# Patient Record
Sex: FEMALE | Race: WHITE | NOT HISPANIC OR LATINO | Employment: FULL TIME | URBAN - METROPOLITAN AREA
[De-identification: names, ages, dates, MRNs, and addresses within clinical notes are randomized per-mention and may not be internally consistent; named-entity substitution may affect disease eponyms.]

---

## 2022-09-14 ENCOUNTER — PATIENT OUTREACH (OUTPATIENT)
Dept: ONCOLOGY | Facility: CLINIC | Age: 37
End: 2022-09-14

## 2022-09-14 ENCOUNTER — TRANSCRIBE ORDERS (OUTPATIENT)
Dept: OTHER | Age: 37
End: 2022-09-14

## 2022-09-14 DIAGNOSIS — Z80.3 FAMILY HISTORY OF MALIGNANT NEOPLASM OF BREAST: Primary | ICD-10-CM

## 2022-09-15 ENCOUNTER — PRE VISIT (OUTPATIENT)
Dept: ONCOLOGY | Facility: CLINIC | Age: 37
End: 2022-09-15

## 2022-09-15 NOTE — TELEPHONE ENCOUNTER
RECORDS STATUS - BREAST    RECORDS REQUESTED FROM: [facility]   DATE REQUESTED: 09/16/22   NOTES DETAILS STATUS   OFFICE NOTE from referring provider External: [facility] 09/06/22: Sandra Wiley   MEDICATION LIST [facility]    IMAGING (NEED IMAGES & REPORT)     MAMMO PACS 08/04/22, 09/25/12

## 2022-09-22 ENCOUNTER — VIRTUAL VISIT (OUTPATIENT)
Dept: ONCOLOGY | Facility: CLINIC | Age: 37
End: 2022-09-22
Attending: GENETIC COUNSELOR, MS
Payer: COMMERCIAL

## 2022-09-22 DIAGNOSIS — Z80.3 FAMILY HISTORY OF MALIGNANT NEOPLASM OF BREAST: Primary | ICD-10-CM

## 2022-09-22 PROCEDURE — 96040 HC GENETIC COUNSELING, EACH 30 MINUTES: CPT | Mod: 95 | Performed by: GENETIC COUNSELOR, MS

## 2022-09-22 NOTE — PATIENT INSTRUCTIONS
Assessing Cancer Risk  Only about 5-10% of cancers are thought to be due to an inherited cancer susceptibility gene.    These families often have:  Several people with the same or related types of cancer  Cancers diagnosed at a young age (before age 50)  Individuals with more than one primary cancer  Multiple generations of the family affected with cancer    Some people may be candidates for genetic testing of more than one gene.  For these families, genetic testing using a cancer panel may be offered.  These panels will test different genes known to increase the risk for breast, ovarian, uterine, and/or other cancers. All of the genes discussed below have published clinical management guidelines for individuals who are found to carry a mutation. The purpose of this handout is to serve as a brief summary of the genes analyzed by the panels used to inquire about hereditary breast and gynecologic cancer:  DYLAN, BRCA1, BRCA2, BRIP1, CDH1, CHEK2, MLH1, MSH2, MSH6, PMS2, EPCAM, PTEN, PALB2, RAD51C, RAD51D, and TP53.  ______________________________________________________________________________  Hereditary Breast and Ovarian Cancer Syndrome   (BRCA1 and BRCA2)  A single mutation in one of the copies of BRCA1 or BRCA2 increases the risk for breast and ovarian cancer, among others.  The risk for pancreatic cancer and melanoma may also be slightly increased in some families.  The chart below shows the chance that someone with a BRCA mutation would develop cancer in his or her lifetime1,2,3,4.        A person s ethnic background is also important to consider, as individuals of Ashkenazi Religion ancestry have a higher chance of having a BRCA gene mutation.  There are three BRCA mutations that occur more frequently in this population.    Gilbert Syndrome   (MLH1, MSH2, MSH6, PMS2, and EPCAM)  Currently five genes are known to cause Gilbert Syndrome: MLH1, MSH2, MSH6, PMS2, and EPCAM.  A single mutation in one of the Gilbert  Syndrome genes increases the risk for colon, endometrial, ovarian, and stomach cancers.  Other cancers that occur less commonly in Gilbert Syndrome include urinary tract, skin, and brain cancers.  The chart below shows the chance that a person with Gilbert syndrome would develop cancer in his or her lifetime5.      *Cancer risk varies depending on Gilbert syndrome gene found    Cowden Syndrome   (PTEN)  Cowden syndrome is a hereditary condition that increases the risk for breast, thyroid, endometrial, colon, and kidney cancer.  Cowden syndrome is caused by a mutation in the PTEN gene.  A single mutation in one of the copies of PTEN causes Cowden syndrome and increases cancer risk.  The chart below shows the chance that someone with a PTEN mutation would develop cancer in their lifetime6,7.  Other benign features seen in some individuals with Cowden syndrome include benign skin lesions (facial papules, keratoses, lipomas), learning disability, autism, thyroid nodules, colon polyps, and larger head size.      *One recent study found breast cancer risk to be increased to 85%    Li-Fraumeni Syndrome   (TP53)  Li-Fraumeni Syndrome (LFS) is a cancer predisposition syndrome caused by a mutation in the TP53 gene. A single mutation in one of the copies of TP53 increases the risk for multiple cancers. Individuals with LFS are at an increased risk for developing cancer at a young age. The lifetime risk for development of a LFS-associated cancer is 50% by age 30 and 90% by age 60.   Core Cancers: Sarcomas, Breast, Brain, Lung, Leukemias/Lymphomas, Adrenocortical carcinomas  Other Cancers: Gastrointestinal, Thyroid, Skin, Genitourinary    Hereditary Diffuse Gastric Cancer   (CDH1)  Currently, one gene is known to cause hereditary diffuse gastric cancer (HDGC): CDH1.  Individuals with HDGC are at increased risk for diffuse gastric cancer and lobular breast cancer. Of people diagnosed with HDGC, 30-50% have a mutation in the CDH1 gene.   This suggests there are likely other genes that may cause HDGC that have not been identified yet.      Lifetime Cancer Risks    General Population HDGC    Diffuse Gastric  <1% ~80%   Breast 12% 39-52%         Additional Genes  DYLAN  DYLAN is a moderate-risk breast cancer gene. Women who have a mutation in DYLAN can have between a 2-4 fold increased risk for breast cancer compared to the general population8. DYLAN mutations have also been associated with increased risk for pancreatic cancer, however an estimate of this cancer risk is not well understood9. Individuals who inherit two DYLAN mutations have a condition called ataxia-telangiectasia (AT).  This rare autosomal recessive condition affects the nervous system and immune system, and is associated with progressive cerebellar ataxia beginning in childhood.  Individuals with ataxia-telangiectasia often have a weakened immune system and have an increased risk for childhood cancers.    PALB2  Mutations in PALB2 have been shown to increase the risk of breast cancer up to 33-58% in some families; where individuals fall within this risk range is dependent upon family [id number]. PALB2 mutations have also been associated with increased risk for pancreatic cancer, although this risk has not been quantified yet.  Individuals who inherit two PALB2 mutations--one from their mother and one from their father--have a condition called Fanconi Anemia.  This rare autosomal recessive condition is associated with short stature, developmental delay, bone marrow failure, and increased risk for childhood cancers.    CHEK2   CHEK2 is a moderate-risk breast cancer gene.  Women who have a mutation in CHEK2 have around a 2-fold increased risk for breast cancer compared to the general population, and this risk may be higher depending upon family history.11,12,13 Mutations in CHEK2 have also been shown to increase the risk of a number of other cancers, including colon and prostate, however these  cancer risks are currently not well understood.    BRIP1, RAD51C and RAD51D  Mutations in BRIP1, RAD51C, and RAD51D have been shown to increase the risk of ovarian cancer and possibly female breast cancer as well14,15 .       Lifetime Cancer Risk    General Population BRIP1 RAD51C RAD51D   Ovarian 1-2% ~5-8% ~5-9% ~7-15%           Inheritance  All of the cancer syndromes reviewed above are inherited in an autosomal dominant pattern.  This means that if a parent has a mutation, each of his or her children will have a 50% chance of inheriting that same mutation.  Therefore, each child--male or female--would have a 50% chance of being at increased risk for developing cancer.      Image obtained from Genetics Home Reference, 2013     Mutations in some genes can occur de jose cruz, which means that a person s mutation occurred for the first time in them and was not inherited from a parent.  Now that they have the mutation, however, it can be passed on to future generations.    Genetic Testing  Genetic testing involves a blood test and will look at the genetic information in the DYLAN, BRCA1, BRCA2, BRIP1, CDH1, CHEK2, MLH1, MSH2, MSH6, PMS2, EPCAM, PTEN, PALB2, RAD51C, RAD51D, and TP53 genes for any harmful mutations that are associated with increased cancer risk.  If possible, it is recommended that the person(s) who has had cancer be tested before other family members.  That person will give us the most useful information about whether or not a specific gene is associated with the cancer in the family.    Results  There are three possible results of genetic testing:  Positive--a harmful mutation was identified in one or more of the genes  Negative--no mutation was identified in any of the genes on this panel  Variant of unknown significance--a variation in one of the genes was identified, but it is unclear how this impacts cancer risk in the family    Advantages and Disadvantages   There are advantages and disadvantages to  genetic testing.    Advantages  May clarify your cancer risk  Can help you make medical decisions  May explain the cancers in your family  May give useful information to your family members (if you share your results)    Disadvantages  Possible negative emotional impact of learning about inherited cancer risk  Uncertainty in interpreting a negative test result in some situations  Possible genetic discrimination concerns (see below)    Genetic Information Nondiscrimination Act (ELEANOR)  ELEANOR is a federal law that protects individuals from health insurance or employment discrimination based on a genetic test result alone.  Although rare, there are currently no legal discrimination protections in terms of life insurance, long term care, or disability insurances.  Visit the [employer] [place] website to learn more.    Reducing Cancer Risk  All of the genes described above have nationally recognized cancer screening guidelines that would be recommended for individuals who test positive.  In addition to increased cancer screening, surgeries may be offered or recommended to reduce cancer risk.  Recommendations are based upon an individual s genetic test result as well as their personal and family history of cancer.    Questions to Think About Regarding Genetic Testing:  What effect will the test result have on me and my relationship with my family members if I have an inherited gene mutation?  If I don t have a gene mutation?  Should I share my test results, and how will my family react to this news, which may also affect them?  Are my children ready to learn new information that may one day affect their own health?    Hereditary Cancer Resources    FORCE: Facing Our Risk of Cancer Empowered facingourrisk.org   Bright Pink bebrightpink.org   Li-Fraumeni Syndrome Association lfsassociation.org   PTEN World PTENworld.Built In   No stomach for cancer, Inc. nostomachforcancer.org   Stomach cancer relief network  Scrnet.org   Collaborative Group of the Americas on Inherited Colorectal Cancer (CGA) cgaicc.com    Cancer Care cancercare.org   American Cancer Society (ACS) cancer.org   National Cancer Downey (NCI) cancer.gov     Please call us if you have any questions or concerns.   Cancer Risk Management Program [phone] ([phone])  Eddie Ward, MS [facility]  [phone]  Salome Alexander, MS, [facility] [phone]  Yoselyn Grimm, MS, [facility]  [phone]  Emily Hudson, MS, [facility]  [phone]  Yari Torin, MS, [facility]  [phone]  Nancy Zacarias, MS, [facility] [phone]  Reina Rogers, MS, [facility] [phone]    References  Rachael Germain PDP, Rylan S, Samaria SMITH, Hari JE, Lakeshia JL, Jaz N, Michael H, Horacio O, Arin A, Zaid B, Yessy P, Manleslie S, Sabine DM, Dom N, Helga E, Erick H, Omer E, Lubinski J, Gronwald J, Rickey B, Marline H, Thorlacius S, Eerola H, Jose Angel H, Marlen K, Monica OP. Average risks of breast and ovarian cancer associated with BRCA1 or BRCA2 mutations detected in case series unselected for family history: a combined analysis of 222 studies. Am J Hum Lisa. 2003;72:1117-30.  Alice N, Elba M, Garcia G.  BRCA1 and BRCA2 Hereditary Breast and Ovarian Cancer. Gene Reviews online. 2013.  Jayce YC, Drew S, Brittany G, Foss S. Breast cancer risk among male BRCA1 and BRCA2 mutation carriers. J Natl Cancer Inst. 2007;99:1811-4.  Dax MOORE, Bob I, Micha J, Niru E, Angela ER, Mainor F. Risk of breast cancer in male BRCA2 carriers. J Med Lisa. 2010;47:710-1.  National Comprehensive Cancer Network. Clinical practice guidelines in oncology, colorectal cancer screening. Available online (registration required). 2015.  Henry SANZ, Kalyani J, Pao J, Batool LA, Tay MS, Eng C. Lifetime cancer risks in individuals with germline PTEN mutations. Clin Cancer Res. 2012;18:400-7.  Ivet CHAMORRO. Cowden Syndrome: A Critical Review of the Clinical Literature. J Lisa .  2009:18:13-27.  Jason A, Howard D, Vasu S, Rhonda P, Tyrell T, Ko M, Andrew B, Arik H, Giulia R, Jeannine K, Nona L, Dax DG, Sabine D, Sridhar DF, Fernanda MR, The Breast Cancer Susceptibility Collaboration () & Alem PRICE. DYLAN mutations that cause ataxia-telangiectasia are breast cancer susceptibility alleles. Nature Genetics. 2006;38:873-875  Barber N , Abilio Y, Myrtle J, Ilene L, Negra GM , Renee ML, Gallinger S, Romo AG, Syngal S, Francisco ML, Karley J , Nohemi R, Priya SZ, Deepak JR, Rama VE, Pierce M, Voinlton B, Zoie N, Don RH, Angy KW, and Rashaun AP. DYLAN mutations in patients with hereditary pancreatic cancer. Cancer Discover. 2012;2:41-46  Nadia HAND., et al. Breast-Cancer Risk in Families with Mutations in PALB2. NEJM. 2014; 371(6):497-506.  CHEK2 Breast Cancer Case-Control Consortium. CHEK2*1100delC and susceptibility to breast cancer: A collaborative analysis involving 10,860 breast cancer cases and 9,065 controls from 10 studies. Am J Hum Lisa, 74 (2004), pp. 2182-3718  Gaurang T, Wilmar S, Bouchra K, et al. Spectrum of Mutations in BRCA1, BRCA2, CHEK2, and TP53 in Families at High Risk of Breast Cancer. RAPHAEL. 2006;295(12):1121-7143.   Lenny C, Hema D, Sarath A, et al. Risk of breast cancer in women with a CHEK2 mutation with and without a family history of breast cancer. J Clin Oncol. 2011;29:4893-6254.  Kory H, Magalys E, Noe SJ, et al. Contribution of germline mutations in the RAD51B, RAD51C, and RAD51D genes to ovarian cancer in the population. J Clin Oncol. 2015;33(26):4260-7288. Doi:10.1200/JCO.2015.61.2408.  Ludwin Sandovalon DF, Caitlin P, et al. Mutations in BRIP1 confer high risk of ovarian cancer. Whitney Lisa. 2011;43(11):3491-7766. doi:10.1038/ng.955.

## 2022-09-22 NOTE — PROGRESS NOTES
Khushboo is a 36 year old who is being evaluated via a billable video visit.      Pt is in [place]    How would you like to obtain your AVS? MyChart  If the video visit is dropped, the invitation should be resent by: Send to e-mail at: [email or web address]  Will anyone else be joining your video visit? No    Video-Visit Details    Video Start Time: 11:00 am    Type of service:  Video Visit    Video End Time:11:42 am    Originating Location (pt. Location): Home    Distant Location (provider location):  [facility]     Platform used for Video Visit: Caterina GRANADOS    9/22/2022    Referring Provider: Sandra Gallegos MD     Presenting Information:   I spoke with Khushboo Colon over video today for genetic counseling to discuss her family history of cancer. With her permission, this appointment was conducted virtually due to COVID-19 precautions. We talked today to review this history, cancer screening recommendations, and available genetic testing options.    Personal History:  Khushboo is a 36 year old female. She does not have any personal history of cancer. She reports that she had genetic testing many years ago, but does not remember the results.     She had her first menstrual period at age 9, her first child at age 30, and is premenopausal. Khushboo has her ovaries, fallopian tubes and uterus in place. She had a tubal ligation in 2016. She reports that she has not used hormone replacement therapy. She has used oral contraceptives and used progesterone for both of her pregnancies. She has clinical breast exams and mammograms; her most recent mammogram on 8/4/22 was negative. She reports that she is planning to have a bilateral mastectomy due to her family history of breast cancer, regardless of her genetic testing results. Khushboo has not had a colonoscopy due to her age. Khushboo reported no history of tobacco use and rare alcohol use. She had exposures in [place] via burning  and other chemicals.    Family History: (Please see scanned pedigree for detailed family history information)  Siblings:    She has one full brother (age 35), a maternal half-brother (age 31) and maternal half-sister (age 33), and a paternal half-brother (age 19). None of her siblings have any history of cancer, although she reports that they do not go to doctors regularly.    Her half-sister has a son who has sleep apnea, a benign brain tumor (not operable), and sinus issues related to this.  Maternal:    Her mother was diagnosed with breast cancer at age 33 that was stage 4 at diagnosis. She passed away at age 35. She did not have any genetic testing.     Her grandmother was diagnosed with breast cancer in her early 50s and passed away at age 53. No genetic testing.   Paternal:    Her father is 60 years old with no known history of cancer.     Her aunt is 57 years old and has a history of cervical cancer in 2010, treated with hysterectomy.     Her maternal ethnicity is unknown. Her paternal ethnicity is Mongolian, English. There is no known Ashkenazi Shinto ancestry on either side of her family.     Discussion:    Khushboo's family history of cancer is suggestive of a hereditary cancer syndrome.    We reviewed the features of sporadic, familial, and hereditary cancers. In looking at Khushboo's family history, it is possible that a cancer susceptibility gene is present due to her maternal family history of breast cancer in her mother and grandmother.    Khushboo reports that she had genetic testing many years ago, but does not remember the results. We discussed that there are now a number of genes in which mutations are known to be associated with an increased risk for breast cancer, therefore updated genetic testing is indicated.    We discussed the natural history and genetics of hereditary cancer. Based on her family history, we discussed the BRCA1 and BRCA2 genes. Mutations in these genes cause a condition known as  Hereditary Breast and Ovarian Cancer syndrome (HBOC). Women with a mutation in either of these genes are at increased risk for breast and ovarian cancer. There is also an increased risk for a second primary breast cancer. Men with a mutation in either of these genes are at increased risk for breast and prostate cancer. Both women and men may also be at increased risk for pancreatic cancer and melanoma. A detailed handout regarding these genes and other genes in which mutations are associated with an increased risk for breast cancer will be provided to Khushboo via [employer] and can be found in the after visit summary. Topics included: inheritance pattern, cancer risks, cancer screening recommendations, and also risks, benefits and limitations of testing.      Based on her personal and family history, Khushboo meets current National Comprehensive Cancer Network (NCCN) criteria for genetic testing of high-penetrance breast cancer susceptibility genes, specifically, BRCA1, BRCA2, CDH1, PALB2, PTEN, and TP53.       We discussed that there are additional genes that could cause increased risk for breast and other cancers. As many of these genes present with overlapping features in a family and accurate cancer risk cannot always be established based upon the pedigree analysis alone, it would be reasonable for Khushboo to consider panel genetic testing to analyze multiple genes at once.    We reviewed genetic testing options for Khushboo based on her personal and family history: a panel of genes associated with an increased risk for certain cancers, or larger panel options to include genes associated with increased risk for multiple different cancer types. She expressed an interest in more broad testing and opted for the CancerNext Panel.  Genetic testing is available for 36 genes associated with hereditary cancer: CancerNext (APC, DLYAN, AXIN2, BARD1, BMPR1A, BRCA1, BRCA2, BRIP1, CDH1, CDK4, CDKN2A, CHEK2, DICER1, EPCAM, GREM1,  HOXB13, MLH1, MSH2, MSH3, MSH6, MUTYH, NBN, NF1, NTHL1, PALB2, PMS2, POLD1, POLE, PTEN, RAD51C, RAD51D, RECQL, SMAD4, SMARCA4, STK11, and TP53).  We discussed that many of the genes in the CancerNext panel are associated with specific hereditary cancer syndromes and published management guidelines: Hereditary Breast and Ovarian Cancer syndrome (BRCA1, BRCA2), Gilbert syndrome (MLH1, MSH2, MSH6, PMS2, EPCAM), Familial Adenomatous Polyposis (APC), Hereditary Diffuse Gastric Cancer (CDH1), Familial Atypical Multiple Mole Melanoma syndrome (CDK4, CDKN2A), Juvenile Polyposis syndrome (BMPR1A, SMAD4), Cowden syndrome (PTEN), Li Fraumeni syndrome (TP53), Peutz-Jeghers syndrome (STK11), MUTYH Associated Polyposis (MUTYH), and Neurofibromatosis type 1 (NF1).   The DYLAN, AXIN2, BRIP1, CHEK2, GREM1, MSH3, NBN, NTHL1, PALB2, POLD1, POLE, RAD51C, and RAD51D genes are associated with increased cancer risk and have published management guidelines for certain cancers.    The remaining genes (BARD1, DICER1, HOXB13, RECQL, and SMARCA4) are associated with increased cancer risk and may allow us to make medical recommendations when mutations are identified.      Due to COVID-19 precautions consent was obtained over the phone/video today. Genetic testing via the CancerNext Panel will be sent to [facility]. Khushboo opted to have a saliva sample collection kit shipped directly to her home from [facility]. She will ship the kit back to [facility] for analysis. Turnaround time from date when sample is received at the lab: approximately 3-4 weeks.    Medical Management: For Khushboo, we reviewed that the information from genetic testing may determine:    additional cancer screening for which Khushboo may qualify (i.e. mammogram and breast MRI, more frequent colonoscopies, more frequent dermatologic exams, etc.),    options for risk reducing surgeries Khushboo could consider (i.e. bilateral mastectomy, surgery to remove her ovaries and/or  uterus, etc.),      and targeted chemotherapies if she were to develop certain cancers in the future (i.e. immunotherapy for individuals with Gilbert syndrome, PARP inhibitors, etc.).     These recommendations will be discussed in detail once genetic testing is completed.     Plan:  1) Today Khushboo elected to proceed with genetic testing via the CancerNext Panel offered by [facility].  2) This information should be available in 4-5 weeks.  3) Khushboo will be scheduled for a virtual visit (phone or video) to discuss the results.    Yari Harkins MS, [facility]  Licensed, Certified Genetic Counselor  Office: [phone]  Email: [email or web address]

## 2022-09-22 NOTE — LETTER
Cancer Risk Management  Program Locations    [facility]  [facility]  [facility]  [facility]  [facility]  Mailing Address  Cancer Risk Management Program  [address]  [place] [address]    New patient appointments  [phone]  September 22, 2022    Khushboo Colon  [address] [facility] [address]      Dear Khushboo,    It was a pleasure speaking with you over video for genetic counseling on 9/22/2022. Here is a copy of the progress note from our discussion. If you have any additional questions, please feel free to call.     Referring Provider: Sandra Gallegos MD     Presenting Information:   I spoke with Khushboo Colon over video today for genetic counseling to discuss her family history of cancer. With her permission, this appointment was conducted virtually due to COVID-19 precautions. We talked today to review this history, cancer screening recommendations, and available genetic testing options.    Personal History:  Khushboo is a 36 year old female. She does not have any personal history of cancer. She reports that she had genetic testing many years ago, but does not remember the results.     She had her first menstrual period at age 9, her first child at age 30, and is premenopausal. Khushboo has her ovaries, fallopian tubes and uterus in place. She had a tubal ligation in 2016. She reports that she has not used hormone replacement therapy. She has used oral contraceptives and used progesterone for both of her pregnancies. She has clinical breast exams and mammograms; her most recent mammogram on 8/4/22 was negative. She reports that she is planning to have a bilateral mastectomy due to her family history of breast cancer, regardless of her genetic testing results. Khushboo has not had a colonoscopy due to her age. Khushboo  reported no history of tobacco use and rare alcohol use. She had exposures in [place] via burning and other chemicals.    Family History: (Please see scanned pedigree for detailed family history information)  Siblings:    She has one full brother (age 35), a maternal half-brother (age 31) and maternal half-sister (age 33), and a paternal half-brother (age 19). None of her siblings have any history of cancer, although she reports that they do not go to doctors regularly.    Her half-sister has a son who has sleep apnea, a benign brain tumor (not operable), and sinus issues related to this.  Maternal:    Her mother was diagnosed with breast cancer at age 33 that was stage 4 at diagnosis. She passed away at age 35. She did not have any genetic testing.     Her grandmother was diagnosed with breast cancer in her early 50s and passed away at age 53. No genetic testing.   Paternal:    Her father is 60 years old with no known history of cancer.     Her aunt is 57 years old and has a history of cervical cancer in 2010, treated with hysterectomy.     Her maternal ethnicity is unknown. Her paternal ethnicity is Syrian, English. There is no known Ashkenazi Faith ancestry on either side of her family.     Discussion:    Khushboo's family history of cancer is suggestive of a hereditary cancer syndrome.    We reviewed the features of sporadic, familial, and hereditary cancers. In looking at Khushboo's family history, it is possible that a cancer susceptibility gene is present due to her maternal family history of breast cancer in her mother and grandmother.    Khushboo reports that she had genetic testing many years ago, but does not remember the results. We discussed that there are now a number of genes in which mutations are known to be associated with an increased risk for breast cancer, therefore updated genetic testing is indicated.    We discussed the natural history and genetics of hereditary cancer. Based on her family history,  we discussed the BRCA1 and BRCA2 genes. Mutations in these genes cause a condition known as Hereditary Breast and Ovarian Cancer syndrome (HBOC). Women with a mutation in either of these genes are at increased risk for breast and ovarian cancer. There is also an increased risk for a second primary breast cancer. Men with a mutation in either of these genes are at increased risk for breast and prostate cancer. Both women and men may also be at increased risk for pancreatic cancer and melanoma. A detailed handout regarding these genes and other genes in which mutations are associated with an increased risk for breast cancer will be provided to Khushboo via [employer] and can be found in the after visit summary. Topics included: inheritance pattern, cancer risks, cancer screening recommendations, and also risks, benefits and limitations of testing.      Based on her personal and family history, Khushboo meets current National Comprehensive Cancer Network (NCCN) criteria for genetic testing of high-penetrance breast cancer susceptibility genes, specifically, BRCA1, BRCA2, CDH1, PALB2, PTEN, and TP53.       We discussed that there are additional genes that could cause increased risk for breast and other cancers. As many of these genes present with overlapping features in a family and accurate cancer risk cannot always be established based upon the pedigree analysis alone, it would be reasonable for Khushboo to consider panel genetic testing to analyze multiple genes at once.    We reviewed genetic testing options for Khushboo based on her personal and family history: a panel of genes associated with an increased risk for certain cancers, or larger panel options to include genes associated with increased risk for multiple different cancer types. She expressed an interest in more broad testing and opted for the CancerNext Panel.  Genetic testing is available for 36 genes associated with hereditary cancer: CancerNext (APC, DYLAN, AXIN2,  BARD1, BMPR1A, BRCA1, BRCA2, BRIP1, CDH1, CDK4, CDKN2A, CHEK2, DICER1, EPCAM, GREM1, HOXB13, MLH1, MSH2, MSH3, MSH6, MUTYH, NBN, NF1, NTHL1, PALB2, PMS2, POLD1, POLE, PTEN, RAD51C, RAD51D, RECQL, SMAD4, SMARCA4, STK11, and TP53).  We discussed that many of the genes in the CancerNext panel are associated with specific hereditary cancer syndromes and published management guidelines: Hereditary Breast and Ovarian Cancer syndrome (BRCA1, BRCA2), Gilbert syndrome (MLH1, MSH2, MSH6, PMS2, EPCAM), Familial Adenomatous Polyposis (APC), Hereditary Diffuse Gastric Cancer (CDH1), Familial Atypical Multiple Mole Melanoma syndrome (CDK4, CDKN2A), Juvenile Polyposis syndrome (BMPR1A, SMAD4), Cowden syndrome (PTEN), Li Fraumeni syndrome (TP53), Peutz-Jeghers syndrome (STK11), MUTYH Associated Polyposis (MUTYH), and Neurofibromatosis type 1 (NF1).   The DYLAN, AXIN2, BRIP1, CHEK2, GREM1, MSH3, NBN, NTHL1, PALB2, POLD1, POLE, RAD51C, and RAD51D genes are associated with increased cancer risk and have published management guidelines for certain cancers.    The remaining genes (BARD1, DICER1, HOXB13, RECQL, and SMARCA4) are associated with increased cancer risk and may allow us to make medical recommendations when mutations are identified.      Due to COVID-19 precautions consent was obtained over the phone/video today. Genetic testing via the CancerNext Panel will be sent to [facility]. Khushboo opted to have a saliva sample collection kit shipped directly to her home from [facility]. She will ship the kit back to [facility] for analysis. Turnaround time from date when sample is received at the lab: approximately 3-4 weeks.    Medical Management: For Khushboo, we reviewed that the information from genetic testing may determine:    additional cancer screening for which Khushboo may qualify (i.e. mammogram and breast MRI, more frequent colonoscopies, more frequent dermatologic exams, etc.),    options for risk reducing surgeries  Khushboo could consider (i.e. bilateral mastectomy, surgery to remove her ovaries and/or uterus, etc.),      and targeted chemotherapies if she were to develop certain cancers in the future (i.e. immunotherapy for individuals with Gilbert syndrome, PARP inhibitors, etc.).     These recommendations will be discussed in detail once genetic testing is completed.     Plan:  1) Today Khushboo elected to proceed with genetic testing via the CancerNext Panel offered by [employer].  2) This information should be available in 4-5 weeks.  3) Khushboo will be scheduled for a virtual visit (phone or video) to discuss the results.    Yari Harkins MS, [facility]  Licensed, Certified Genetic Counselor  Office: [phone]  Email: [email or web address]

## 2022-10-03 ENCOUNTER — HEALTH MAINTENANCE LETTER (OUTPATIENT)
Age: 37
End: 2022-10-03

## 2022-10-14 ENCOUNTER — TELEPHONE (OUTPATIENT)
Dept: ONCOLOGY | Facility: CLINIC | Age: 37
End: 2022-10-14

## 2022-10-14 NOTE — TELEPHONE ENCOUNTER
LVM for pt to call coverage department and provide member/subsciber ID for [facility] coverage  Mitra GRANADOS

## 2023-10-22 ENCOUNTER — HEALTH MAINTENANCE LETTER (OUTPATIENT)
Age: 38
End: 2023-10-22

## 2024-12-15 ENCOUNTER — HEALTH MAINTENANCE LETTER (OUTPATIENT)
Age: 39
End: 2024-12-15